# Patient Record
Sex: FEMALE | Race: ASIAN
[De-identification: names, ages, dates, MRNs, and addresses within clinical notes are randomized per-mention and may not be internally consistent; named-entity substitution may affect disease eponyms.]

---

## 2021-06-18 PROBLEM — Z00.00 ENCOUNTER FOR PREVENTIVE HEALTH EXAMINATION: Status: ACTIVE | Noted: 2021-06-18

## 2021-06-24 ENCOUNTER — LABORATORY RESULT (OUTPATIENT)
Age: 81
End: 2021-06-24

## 2021-06-24 ENCOUNTER — APPOINTMENT (OUTPATIENT)
Dept: NEPHROLOGY | Facility: CLINIC | Age: 81
End: 2021-06-24
Payer: MEDICARE

## 2021-06-24 ENCOUNTER — NON-APPOINTMENT (OUTPATIENT)
Age: 81
End: 2021-06-24

## 2021-06-24 VITALS — HEART RATE: 76 BPM | OXYGEN SATURATION: 99 % | WEIGHT: 92 LBS | TEMPERATURE: 98.1 F

## 2021-06-24 VITALS — HEART RATE: 72 BPM | DIASTOLIC BLOOD PRESSURE: 70 MMHG | SYSTOLIC BLOOD PRESSURE: 120 MMHG

## 2021-06-24 VITALS — HEART RATE: 72 BPM | DIASTOLIC BLOOD PRESSURE: 70 MMHG | SYSTOLIC BLOOD PRESSURE: 114 MMHG

## 2021-06-24 VITALS — SYSTOLIC BLOOD PRESSURE: 120 MMHG | HEART RATE: 84 BPM | DIASTOLIC BLOOD PRESSURE: 80 MMHG

## 2021-06-24 DIAGNOSIS — H40.9 UNSPECIFIED GLAUCOMA: ICD-10-CM

## 2021-06-24 DIAGNOSIS — L40.9 PSORIASIS, UNSPECIFIED: ICD-10-CM

## 2021-06-24 DIAGNOSIS — R29.890 LOSS OF HEIGHT: ICD-10-CM

## 2021-06-24 DIAGNOSIS — Z63.79 OTHER STRESSFUL LIFE EVENTS AFFECTING FAMILY AND HOUSEHOLD: ICD-10-CM

## 2021-06-24 DIAGNOSIS — R19.7 DIARRHEA, UNSPECIFIED: ICD-10-CM

## 2021-06-24 PROCEDURE — 36415 COLL VENOUS BLD VENIPUNCTURE: CPT

## 2021-06-24 PROCEDURE — 99205 OFFICE O/P NEW HI 60 MIN: CPT | Mod: 25

## 2021-06-24 NOTE — ADDENDUM
[FreeTextEntry1] : All medical record entries made by the Scribe were at my, Dr. Chang Hebert, direction and personally dictated by me on 06/24/2021. I have reviewed the chart and agree that the record accurately reflects my personal performance of the history, physical exam, assessment and plan. I have also personally directed, reviewed, and agreed with the chart.

## 2021-06-24 NOTE — PHYSICAL EXAM
[General Appearance - Alert] : alert [General Appearance - In No Acute Distress] : in no acute distress [Sclera] : the sclera and conjunctiva were normal [PERRL With Normal Accommodation] : pupils were equal in size, round, and reactive to light [Extraocular Movements] : extraocular movements were intact [Outer Ear] : the ears and nose were normal in appearance [Hearing Threshold Finger Rub Not [place]] : hearing was normal [Examination Of The Oral Cavity] : the lips and gums were normal [Neck Appearance] : the appearance of the neck was normal [Neck Cervical Mass (___cm)] : no neck mass was observed [Jugular Venous Distention Increased] : there was no jugular-venous distention [Thyroid Diffuse Enlargement] : the thyroid was not enlarged [Thyroid Nodule] : there were no palpable thyroid nodules [Auscultation Breath Sounds / Voice Sounds] : lungs were clear to auscultation bilaterally [Heart Rate And Rhythm] : heart rate was normal and rhythm regular [Heart Sounds] : normal S1 and S2 [Heart Sounds Gallop] : no gallops [Murmurs] : no murmurs [Heart Sounds Pericardial Friction Rub] : no pericardial rub [Edema] : there was no peripheral edema [Bowel Sounds] : normal bowel sounds [Abdomen Soft] : soft [Abdomen Tenderness] : non-tender [Abdomen Mass (___ Cm)] : no abdominal mass palpated [No CVA Tenderness] : no ~M costovertebral angle tenderness [No Spinal Tenderness] : no spinal tenderness [Abnormal Walk] : normal gait [Involuntary Movements] : no involuntary movements were seen [Musculoskeletal - Swelling] : no joint swelling seen [Motor Tone] : muscle strength and tone were normal [Skin Color & Pigmentation] : normal skin color and pigmentation [Skin Turgor] : normal skin turgor [] : no rash [No Focal Deficits] : no focal deficits [Oriented To Time, Place, And Person] : oriented to person, place, and time [Impaired Insight] : insight and judgment were intact [Affect] : the affect was normal

## 2021-06-24 NOTE — ASSESSMENT
[FreeTextEntry1] : Plan:\par 1) BP well-controlled in office today.\par 2) EKG taken last week reviewed, which revealed no abnormalities. Advised patient to request her most recent cardiology records be sent to my office for review.\par 3) Fatigue / Anemia: Last reported HGB of ~9. Will check CBC and iron labs on blood work today, and will advise further pending results.\par 4) Weight management: Advised patient to increase caloric and dietary sodium intake with recommend\par 5) S/p lingulectomy: Advised patient to request her records and imaging from Dr. Bejarano be sent to my office for review.\par 6) Per family and patient request, will investigate psychotherapy options for patient in view of her marital difficulties. Will also investigate options for OT/PT/memory rehabilitation.\par 7) Well care: Will have patient receive bone density test. Advised patient to request most recent stress test and echo be sent to my office for review. Also advised patient to receive routine mammogram, breast exam, gynecology exam, and upper and lower endoscopy.\par \par No changes to medications.\par \par Labs were drawn and patient will return in 2-4 weeks for a follow-up appointment.

## 2021-06-24 NOTE — REVIEW OF SYSTEMS
[Negative] : Endocrine [As Noted in HPI] : as noted in HPI [FreeTextEntry2] : Height is currently 5'1" (previously 5'3"). Baseline weight is 104 lbs, now 92 lbs. [FreeTextEntry3] : Glaucoma (followed by son-in-law in ophthalmology at [facility]) [FreeTextEntry7] : Belching, intermittent diarrhea [de-identified] : Nerve pain following lingulectomy, some hand/leg weakness [FreeTextEntry1] : ROS negative except as above, see HPI.

## 2021-06-24 NOTE — END OF VISIT
[Time Spent: ___ minutes] : I have spent [unfilled] minutes of time on the encounter. [FreeTextEntry3] : Documented by Gutierrez Parish acting as a scribe for Dr. Chang Hebert on 06/24/2021.

## 2021-06-24 NOTE — HISTORY OF PRESENT ILLNESS
[FreeTextEntry1] : The patient is a 80 year old female presenting for initial evaluation of fatigue s/p lingulectomy.\par \par Chief Complaint:\par The patient is a nonsmoker with history of stage I lung cancer (NSCLC adenoma), s/p lingulectomy on 21 of neoplasm [place] [facility] [place]ed by Dr. Pettit. Following surgery, patient was given two courses of antibiotics, and sent to urgent care at [facility] [place] on 21. One-time removal of 2 L of fluid in chest, which reportedly was clear exudate and negative for cancer cells. Cultures not sent until 3-4 days later, which was negative for infection. Patient's family believes the patient had cellulitis of the chest wall. Fever up to 102 degrees F with fatigue. Patient had chest tube placed.\par \par Today, the patient is feeling fatigued with noted lack of stamina, muscle fatigue, and lack of sleep. She spends most of her day in the bed, and with low appetite (earlier satiety) that onset following surgery. Reports moderate dizziness after walking short distances. Sometimes SOB after eating. O2 sat of 97-99%. Denies current CP. Patient reports she has lost 14 lbs since her surgery. A home BP log brought to the visit displayed readings in general range from 105-123 systolic, though with rare readings of 96-99 systolic.\par \par Seen by Dr. Austin Bejarano in pulmonology earlier today at [facility]. CXR reportedly revealed no evidence of chest fluid.\par \par - Prior to above operation, the patient noted newly onset eczema involving her right neck, which resolved following the surgery.\par \par Past Medical History:\par - Anemia. Last reported HGB of 9.2, up from lowest of 7.5 at time of surgery, at which time patient received 1 unit PRBC.\par - Psoriasis\par - Denies h/o HTN, CAD, DM, COVID infection. Patient states she has received a COVID vaccine.\par \par Past Surgical History/Hospitalizations:\par - Denies other hospitalizations\par - Transient global amnesia in ER\par \par Family History:\par - Father ; nasopharyngeal carcinoma\par - Mother  age 96 of "old age"\par - Brother  from pericardial effusion; h/o lung cancer (non-smoker)\par - Sister who is well\par \par Social History:\par - 2 children.  though living with her daughter apart from her .\par - Patient born near [facility], fled to [facility] age 22. Moved to [place] in .\par - Patient is a retired [employer] employee.\par - Never smoker, no alcohol, no current pets, no recent travel.\par \par No current medications excepting Kaopectate and Latanoprost eyedrops. Patient not taking lidocaine patch, Colace, Senna, gabapentin, or omeprazole.\par \par Allergies: NKDA

## 2021-06-29 LAB
24R-OH-CALCIDIOL SERPL-MCNC: 60 PG/ML
25(OH)D3 SERPL-MCNC: 23.9 NG/ML
ALBUMIN SERPL ELPH-MCNC: 4 G/DL
ALDOSTERONE SERUM: 12.5 NG/DL
ALP BLD-CCNC: 100 U/L
ALP BONE SERPL-MCNC: 11.2 UG/L
ALT SERPL-CCNC: 9 U/L
AMYLASE P SERPL-CCNC: 27 U/L
AMYLASE S SERPL-CCNC: 75 U/L
AMYLASE SERPL-CCNC: 102 U/L
ANA PAT FLD IF-IMP: ABNORMAL
ANA SER IF-ACNC: ABNORMAL
ANION GAP SERPL CALC-SCNC: 11 MMOL/L
APPEARANCE: ABNORMAL
AST SERPL-CCNC: 17 U/L
B2 MICROGLOB SERPL-MCNC: 3.3 MG/L
BACTERIA UR CULT: NORMAL
BACTERIA: NEGATIVE
BASOPHILS # BLD AUTO: 0.03 K/UL
BASOPHILS NFR BLD AUTO: 0.5 %
BILIRUB SERPL-MCNC: 0.2 MG/DL
BILIRUBIN URINE: NEGATIVE
BLOOD URINE: NEGATIVE
BUN SERPL-MCNC: 28 MG/DL
C3 SERPL-MCNC: 103 MG/DL
C4 SERPL-MCNC: 23 MG/DL
CALCIUM SERPL-MCNC: 9.2 MG/DL
CALCIUM SERPL-MCNC: 9.2 MG/DL
CARDIOLIPIN AB SER IA-ACNC: POSITIVE
CARDIOLIPIN IGM SER-MCNC: 12.8 MPL
CARDIOLIPIN IGM SER-MCNC: 7.2 GPL
CELIACPAN: NORMAL
CHLORIDE ?TM UR-SCNC: 22 MMOL/L
CHLORIDE SERPL-SCNC: 105 MMOL/L
CHOLEST SERPL-MCNC: 154 MG/DL
CO2 SERPL-SCNC: 23 MMOL/L
COLOR: NORMAL
COVID-19 NUCLEOCAPSID  GAM ANTIBODY INTERPRETATION: NEGATIVE
COVID-19 SPIKE DOMAIN ANTIBODY INTERPRETATION: POSITIVE
CREAT SERPL-MCNC: 1.23 MG/DL
CREAT SPEC-SCNC: 175 MG/DL
CREAT/PROT UR: 0.1 RATIO
CRP SERPL-MCNC: <3 MG/L
CYSTATIN C SERPL-MCNC: 1.36 MG/L
DEPRECATED KAPPA LC FREE/LAMBDA SER: 1.27 RATIO
EOSINOPHIL # BLD AUTO: 0.18 K/UL
EOSINOPHIL NFR BLD AUTO: 3.1 %
ERYTHROCYTE [SEDIMENTATION RATE] IN BLOOD BY WESTERGREN METHOD: 39 MM/HR
ESTIMATED AVERAGE GLUCOSE: 100 MG/DL
FERRITIN SERPL-MCNC: 57 NG/ML
FOLATE SERPL-MCNC: 7.1 NG/ML
GFR/BSA.PRED SERPLBLD CYS-BASED-ARV: 45 ML/MIN
GGT SERPL-CCNC: 9 U/L
GLUCOSE QUALITATIVE U: NEGATIVE
GLUCOSE SERPL-MCNC: 88 MG/DL
HBA1C MFR BLD HPLC: 5.1 %
HBV SURFACE AB SER QL: REACTIVE
HBV SURFACE AG SER QL: NONREACTIVE
HCT VFR BLD CALC: 34 %
HCV AB SER QL: NONREACTIVE
HCV S/CO RATIO: 0.24 S/CO
HCYS SERPL-MCNC: 20.2 UMOL/L
HDLC SERPL-MCNC: 63 MG/DL
HGB BLD-MCNC: 10.1 G/DL
HYALINE CASTS: 1 /LPF
IMM GRANULOCYTES NFR BLD AUTO: 0.3 %
IRON SATN MFR SERPL: 10 %
IRON SERPL-MCNC: 33 UG/DL
KAPPA LC CSF-MCNC: 2.83 MG/DL
KAPPA LC SERPL-MCNC: 3.59 MG/DL
KETONES URINE: NEGATIVE
LDLC SERPL CALC-MCNC: 68 MG/DL
LEUKOCYTE ESTERASE URINE: ABNORMAL
LPL SERPL-CCNC: 47 U/L
LYMPHOCYTES # BLD AUTO: 1.84 K/UL
LYMPHOCYTES NFR BLD AUTO: 31.7 %
MAGNESIUM SERPL-MCNC: 2.2 MG/DL
MAN DIFF?: NORMAL
MCHC RBC-ENTMCNC: 26.2 PG
MCHC RBC-ENTMCNC: 29.7 GM/DL
MCV RBC AUTO: 88.1 FL
METHYLMALONATE SERPL-SCNC: 298 NMOL/L
MICROSCOPIC-UA: NORMAL
MONOCYTES # BLD AUTO: 0.46 K/UL
MONOCYTES NFR BLD AUTO: 7.9 %
MPO AB + PR3 PNL SER: NORMAL
NEUTROPHILS # BLD AUTO: 3.27 K/UL
NEUTROPHILS NFR BLD AUTO: 56.5 %
NITRITE URINE: NEGATIVE
NONHDLC SERPL-MCNC: 90 MG/DL
PARATHYROID HORMONE INTACT: 34 PG/ML
PH URINE: 5
PHOSPHATE SERPL-MCNC: 4.1 MG/DL
PLATELET # BLD AUTO: 259 K/UL
POTASSIUM SERPL-SCNC: 4 MMOL/L
PROT SERPL-MCNC: 7.1 G/DL
PROT UR-MCNC: 9 MG/DL
PROTEIN URINE: NORMAL
RBC # BLD: 3.86 M/UL
RBC # FLD: 14.2 %
RED BLOOD CELLS URINE: 1 /HPF
RENIN PLASMA: 11.2 PG/ML
RHEUMATOID FACT SER QL: <10 IU/ML
SARS-COV-2 AB SERPL IA-ACNC: >250 U/ML
SARS-COV-2 AB SERPL QL IA: 0.09 INDEX
SODIUM ?TM SUB UR QN: 29 MMOL/L
SODIUM SERPL-SCNC: 140 MMOL/L
SPECIFIC GRAVITY URINE: 1.03
SQUAMOUS EPITHELIAL CELLS: 1 /HPF
T3FREE SERPL-MCNC: 2.29 PG/ML
T3RU NFR SERPL: 1 TBI
T4 FREE SERPL-MCNC: 1.2 NG/DL
T4 SERPL-MCNC: 7.3 UG/DL
THYROGLOB AB SERPL-ACNC: <20 IU/ML
THYROPEROXIDASE AB SERPL IA-ACNC: 14 IU/ML
TIBC SERPL-MCNC: 323 UG/DL
TRIGL SERPL-MCNC: 112 MG/DL
TSH SERPL-ACNC: 11 UIU/ML
UIBC SERPL-MCNC: 289 UG/DL
URATE SERPL-MCNC: 5.1 MG/DL
UROBILINOGEN URINE: NORMAL
VIT B12 SERPL-MCNC: 439 PG/ML
VIT B2 SERPL-MCNC: 306 UG/L
WBC # FLD AUTO: 5.8 K/UL
WHITE BLOOD CELLS URINE: 4 /HPF

## 2021-07-04 LAB
ALBUMIN MFR SERPL ELPH: 54.3 %
ALBUMIN SERPL-MCNC: 3.9 G/DL
ALBUMIN/GLOB SERPL: 1.2 RATIO
ALPHA1 GLOB MFR SERPL ELPH: 4.4 %
ALPHA1 GLOB SERPL ELPH-MCNC: 0.3 G/DL
ALPHA2 GLOB MFR SERPL ELPH: 9.6 %
ALPHA2 GLOB SERPL ELPH-MCNC: 0.7 G/DL
ANNOTATION COMMENT IMP: NORMAL
B-GLOBULIN MFR SERPL ELPH: 12.2 %
B-GLOBULIN SERPL ELPH-MCNC: 0.9 G/DL
B2 GLYCOPROT1 IGA SERPL IA-ACNC: <5 SAU
B2 GLYCOPROT1 IGG SER-ACNC: <5 SGU
B2 GLYCOPROT1 IGM SER-ACNC: 6.4 SMU
BETA CAROTENE: 45 UG/DL
C1Q IMMUNE COMPLEX: 2 UG EQ/ML
C1Q IMMUNE COMPLEX: 2.3 UG EQ/ML
C3D IMMUNE COMPLEXES: 11 UG EQ/ML
CELIAC DISEASE INTERPRETATION: NORMAL
CELIAC GENE PAIRS PRESENT: NO
COLLAGEN CTX SERPL-MCNC: 238 PG/ML
DEPRECATED KAPPA LC FREE/LAMBDA SER: 1.27 RATIO
DQ ALPHA 1: NORMAL
DQ BETA 1: NORMAL
GAMMA GLOB FLD ELPH-MCNC: 1.4 G/DL
GAMMA GLOB MFR SERPL ELPH: 19.5 %
HLA-DQ2: NEGATIVE
HLA-DQ8 QL: NEGATIVE
IGA SER QL IEP: 274 MG/DL
IGG SER QL IEP: 1476 MG/DL
IGM SER QL IEP: 100 MG/DL
IMMUNOGLOBULIN A (IGA): 266 MG/DL
INTERPRETATION SERPL IEP-IMP: NORMAL
KAPPA LC CSF-MCNC: 2.83 MG/DL
KAPPA LC SERPL-MCNC: 3.59 MG/DL
M PROTEIN SPEC IFE-MCNC: NORMAL
PROT SERPL-MCNC: 7.1 G/DL
PROT SERPL-MCNC: 7.1 G/DL
REF LAB TEST METHOD: NORMAL
RENIN ACTIVITY, PLASMA: 0.49 NG/ML/HR
VIT A SERPL-MCNC: 41.8 UG/DL
VIT B1 SERPL-MCNC: 106.4 NMOL/L
VIT B6 SERPL-MCNC: 10.5 UG/L
VIT C SERPL-MCNC: 0.6 MG/DL

## 2021-07-07 LAB
NICOTINAMIDE: 72.6 NG/ML
NICOTINIC ACID: <5 NG/ML

## 2021-07-09 LAB — MENADIONE SERPL-MCNC: 0.75 NG/ML

## 2021-07-17 ENCOUNTER — TRANSCRIPTION ENCOUNTER (OUTPATIENT)
Age: 81
End: 2021-07-17

## 2021-07-20 ENCOUNTER — LABORATORY RESULT (OUTPATIENT)
Age: 81
End: 2021-07-20

## 2021-07-20 ENCOUNTER — APPOINTMENT (OUTPATIENT)
Dept: NEPHROLOGY | Facility: CLINIC | Age: 81
End: 2021-07-20
Payer: MEDICARE

## 2021-07-20 VITALS — DIASTOLIC BLOOD PRESSURE: 70 MMHG | SYSTOLIC BLOOD PRESSURE: 110 MMHG

## 2021-07-20 VITALS — HEART RATE: 72 BPM

## 2021-07-20 DIAGNOSIS — J90 PLEURAL EFFUSION, NOT ELSEWHERE CLASSIFIED: ICD-10-CM

## 2021-07-20 DIAGNOSIS — C34.92 MALIGNANT NEOPLASM OF UNSPECIFIED PART OF LEFT BRONCHUS OR LUNG: ICD-10-CM

## 2021-07-20 DIAGNOSIS — R68.81 EARLY SATIETY: ICD-10-CM

## 2021-07-20 DIAGNOSIS — N18.30 CHRONIC KIDNEY DISEASE, STAGE 3 UNSPECIFIED: ICD-10-CM

## 2021-07-20 DIAGNOSIS — E03.9 HYPOTHYROIDISM, UNSPECIFIED: ICD-10-CM

## 2021-07-20 DIAGNOSIS — D64.9 ANEMIA, UNSPECIFIED: ICD-10-CM

## 2021-07-20 DIAGNOSIS — R63.0 ANOREXIA: ICD-10-CM

## 2021-07-20 DIAGNOSIS — Z87.898 PERSONAL HISTORY OF OTHER SPECIFIED CONDITIONS: ICD-10-CM

## 2021-07-20 DIAGNOSIS — R63.4 ABNORMAL WEIGHT LOSS: ICD-10-CM

## 2021-07-20 PROCEDURE — 99214 OFFICE O/P EST MOD 30 MIN: CPT | Mod: 25

## 2021-07-20 PROCEDURE — 36415 COLL VENOUS BLD VENIPUNCTURE: CPT

## 2021-07-20 RX ORDER — LEVOTHYROXINE SODIUM 0.03 MG/1
25 TABLET ORAL DAILY
Qty: 90 | Refills: 0 | Status: ACTIVE | COMMUNITY
Start: 2021-07-20 | End: 1900-01-01

## 2021-07-20 NOTE — ADDENDUM
[FreeTextEntry1] : All medical record entries made by the Scribe were at my, Dr. Hebert's, discretion and personally dictated by me on 07/20/2021. I have reviewed the chart and agree that the record accurately reflects my personal performance of the history, physical exam, assessment and plan. I have also personally directed, reviewed and agreed to the chart.

## 2021-07-20 NOTE — PHYSICAL EXAM
[General Appearance - Alert] : alert [General Appearance - In No Acute Distress] : in no acute distress [Sclera] : the sclera and conjunctiva were normal [Extraocular Movements] : extraocular movements were intact [PERRL With Normal Accommodation] : pupils were equal in size, round, and reactive to light [Outer Ear] : the ears and nose were normal in appearance [Hearing Threshold Finger Rub Not [place]] : hearing was normal [Examination Of The Oral Cavity] : the lips and gums were normal [Neck Appearance] : the appearance of the neck was normal [Neck Cervical Mass (___cm)] : no neck mass was observed [Jugular Venous Distention Increased] : there was no jugular-venous distention [Thyroid Diffuse Enlargement] : the thyroid was not enlarged [Thyroid Nodule] : there were no palpable thyroid nodules [Edema] : there was no peripheral edema [Bowel Sounds] : normal bowel sounds [Abdomen Soft] : soft [Abdomen Tenderness] : non-tender [Abdomen Mass (___ Cm)] : no abdominal mass palpated [No CVA Tenderness] : no ~M costovertebral angle tenderness [No Spinal Tenderness] : no spinal tenderness [Abnormal Walk] : normal gait [Musculoskeletal - Swelling] : no joint swelling seen [Involuntary Movements] : no involuntary movements were seen [Motor Tone] : muscle strength and tone were normal [Skin Color & Pigmentation] : normal skin color and pigmentation [Skin Turgor] : normal skin turgor [] : no rash [No Focal Deficits] : no focal deficits [Oriented To Time, Place, And Person] : oriented to person, place, and time [Impaired Insight] : insight and judgment were intact [Affect] : the affect was normal

## 2021-07-20 NOTE — ASSESSMENT
[FreeTextEntry1] : 1) Dysphagia: I ordered a cineesophagogram for further investigation.\par 2) Hypothyroidism: Recent labs show TSH 11 and Vitamin D 25-OH 23.9. Will send patient for a thyroid sonogram and thyroid scan to evaluate hypothyroidism. I advised patient to start Vitamin D3 1000 U QD and 25 mcg thyroxine.\par 3) Iron deficiency anemia: Recent iron saturation was 10%. Will order iron infusions for patient to do at an infusion center. \par 4) Eczema: Referred patient to a dermatologist for further evaluation and treatment.\par 5) Fatigue s/p lingulectomy: Patient's fatigue has been improving but may be exacerbated or partially caused by low vitamin D and iron deficiency anemia. Patient to start Vitamin D supplementation and to do iron infusions.\par 6) CRI: Recent eGFR by cystatin C is 45, creatinine 1.23. At patient's next visit, I will consider ordering a renal scan for further investigation.\par \par Changes to medications: Patient will start Vitamin D3 1000 units daily, do 2 separate IV iron infusions and start 25 mcg thyroxine.\par \par Patient will return in 1 month for a follow-up appointment.

## 2021-07-20 NOTE — HISTORY OF PRESENT ILLNESS
[FreeTextEntry1] : The patient is a 80 year old female nonsmoker presenting for follow up evaluation of fatigue s/p lingulectomy 4[place]18[place] [facility] [place] of stage I lung cancer (NSCLC adenoma) followed by Dr. Pettit. \par \par Following surgery, patient was given two courses of antibiotics, and sent to urgent care at [facility] [place] on 5/20/21. One-time removal of 2 L of fluid in chest, which reportedly was clear exudate and negative for cancer cells. Cultures not sent until 3-4 days later, which was negative for infection. Patient's family believes the patient had cellulitis of the chest wall. Fever up to 102 degrees F with fatigue. Patient had chest tube placed.\par \par Patient presents today with her daughter stating she feels better than she did at her initial visit a month ago, although she continues to feel fatigue and shortness of breath on exertion. She also reports feeling cold often. Pt denies previous thyroid problems and constipation. She also reports a headache localized behind her eye brows that began a few days ago. \par \par Patient reports difficulty swallowing that began about a year ago. She states after her recent operation, her eczema resolved, but it is now coming back and she has it on her neck.\par \par Labs from 6/24/21 reveal:HGB 10.1, HCT 34%, WESR 39, beta-2 microglobulin serum 3.3, eGFR by cystatin C 45, BUN 28, ALT 9, eGFR 42, immunoglob lamdba FLC 2.83, iron saturation 10%, TSH 11, Vitamin D 25-OH 23.9, homocysteine 20.2, immunoglob lambda FLC, hep B surface Ab reactive\par \par Patient does not currently take any medications.

## 2021-07-23 LAB
24R-OH-CALCIDIOL SERPL-MCNC: 62.6 PG/ML
25(OH)D3 SERPL-MCNC: 20.4 NG/ML
ALBUMIN SERPL ELPH-MCNC: 4.2 G/DL
ALP BLD-CCNC: 91 U/L
ALT SERPL-CCNC: 7 U/L
ANA PAT FLD IF-IMP: ABNORMAL
ANA SER IF-ACNC: ABNORMAL
ANION GAP SERPL CALC-SCNC: 21 MMOL/L
AST SERPL-CCNC: 24 U/L
BASOPHILS # BLD AUTO: 0.02 K/UL
BASOPHILS NFR BLD AUTO: 0.4 %
BILIRUB SERPL-MCNC: 0.3 MG/DL
BUN SERPL-MCNC: 26 MG/DL
C3 SERPL-MCNC: 106 MG/DL
C4 SERPL-MCNC: 24 MG/DL
CALCIUM SERPL-MCNC: 9.8 MG/DL
CALCIUM SERPL-MCNC: 9.8 MG/DL
CHLORIDE SERPL-SCNC: 99 MMOL/L
CHOLEST SERPL-MCNC: 175 MG/DL
CO2 SERPL-SCNC: 18 MMOL/L
CREAT SERPL-MCNC: 1.07 MG/DL
EOSINOPHIL # BLD AUTO: 0.11 K/UL
EOSINOPHIL NFR BLD AUTO: 2 %
EPO SERPL-MCNC: 8.6 MIU/ML
ERYTHROCYTE [SEDIMENTATION RATE] IN BLOOD BY WESTERGREN METHOD: 47 MM/HR
ESTIMATED AVERAGE GLUCOSE: 103 MG/DL
FERRITIN SERPL-MCNC: 33 NG/ML
FOLATE SERPL-MCNC: 13.7 NG/ML
GLUCOSE SERPL-MCNC: 23 MG/DL
HBA1C MFR BLD HPLC: 5.2 %
HBV SURFACE AB SER QL: REACTIVE
HBV SURFACE AG SER QL: NONREACTIVE
HCT VFR BLD CALC: 40.3 %
HCV AB SER QL: NONREACTIVE
HCV S/CO RATIO: 0.16 S/CO
HCYS SERPL-MCNC: 12.6 UMOL/L
HDLC SERPL-MCNC: 70 MG/DL
HGB BLD-MCNC: 11.9 G/DL
IMM GRANULOCYTES NFR BLD AUTO: 0.2 %
IRON SATN MFR SERPL: 13 %
IRON SERPL-MCNC: 51 UG/DL
LDLC SERPL CALC-MCNC: 88 MG/DL
LYMPHOCYTES # BLD AUTO: 1.87 K/UL
LYMPHOCYTES NFR BLD AUTO: 34.2 %
MAGNESIUM SERPL-MCNC: 2.4 MG/DL
MAN DIFF?: NORMAL
MCHC RBC-ENTMCNC: 25.8 PG
MCHC RBC-ENTMCNC: 29.5 GM/DL
MCV RBC AUTO: 87.2 FL
MONOCYTES # BLD AUTO: 0.46 K/UL
MONOCYTES NFR BLD AUTO: 8.4 %
MPO AB + PR3 PNL SER: NORMAL
NEUTROPHILS # BLD AUTO: 2.99 K/UL
NEUTROPHILS NFR BLD AUTO: 54.8 %
NONHDLC SERPL-MCNC: 104 MG/DL
PARATHYROID HORMONE INTACT: 28 PG/ML
PHOSPHATE SERPL-MCNC: 4.8 MG/DL
PLATELET # BLD AUTO: 241 K/UL
POTASSIUM SERPL-SCNC: 4.3 MMOL/L
PROLACTIN SERPL-MCNC: 11.9 NG/ML
PROT SERPL-MCNC: 7.6 G/DL
RBC # BLD: 4.62 M/UL
RBC # FLD: 15 %
RHEUMATOID FACT SER QL: <10 IU/ML
SODIUM SERPL-SCNC: 138 MMOL/L
T3FREE SERPL-MCNC: 2.36 PG/ML
T3RU NFR SERPL: 1 TBI
T4 FREE SERPL-MCNC: 1.2 NG/DL
T4 SERPL-MCNC: 7.3 UG/DL
THYROGLOB AB SERPL-ACNC: <20 IU/ML
THYROPEROXIDASE AB SERPL IA-ACNC: 10.4 IU/ML
TIBC SERPL-MCNC: 401 UG/DL
TRIGL SERPL-MCNC: 84 MG/DL
TSH SERPL-ACNC: 8.59 UIU/ML
UIBC SERPL-MCNC: 350 UG/DL
URATE SERPL-MCNC: 6 MG/DL
VIT B12 SERPL-MCNC: 493 PG/ML
WBC # FLD AUTO: 5.46 K/UL

## 2021-07-24 LAB
BETA CAROTENE: 49 UG/DL
COLLAGEN CTX SERPL-MCNC: 307 PG/ML

## 2021-08-01 LAB
ALBUMIN MFR SERPL ELPH: 56 %
ALBUMIN SERPL-MCNC: 4.3 G/DL
ALBUMIN/GLOB SERPL: 1.3 RATIO
ALP BONE SERPL-MCNC: 13.4 UG/L
ALPHA1 GLOB MFR SERPL ELPH: 3.9 %
ALPHA1 GLOB SERPL ELPH-MCNC: 0.3 G/DL
ALPHA2 GLOB MFR SERPL ELPH: 9.3 %
ALPHA2 GLOB SERPL ELPH-MCNC: 0.7 G/DL
B-GLOBULIN MFR SERPL ELPH: 12.5 %
B-GLOBULIN SERPL ELPH-MCNC: 1 G/DL
DEPRECATED KAPPA LC FREE/LAMBDA SER: 1.36 RATIO
GAMMA GLOB FLD ELPH-MCNC: 1.4 G/DL
GAMMA GLOB MFR SERPL ELPH: 18.3 %
IGA SER QL IEP: 281 MG/DL
IGG SER QL IEP: 1558 MG/DL
IGM SER QL IEP: 113 MG/DL
INTERPRETATION SERPL IEP-IMP: NORMAL
KAPPA LC CSF-MCNC: 2.28 MG/DL
KAPPA LC SERPL-MCNC: 3.11 MG/DL
M PROTEIN SPEC IFE-MCNC: NORMAL
METHYLMALONATE SERPL-SCNC: 305 NMOL/L
PROT SERPL-MCNC: 7.6 G/DL
PROT SERPL-MCNC: 7.6 G/DL

## 2021-08-23 ENCOUNTER — APPOINTMENT (OUTPATIENT)
Dept: NEPHROLOGY | Facility: CLINIC | Age: 81
End: 2021-08-23